# Patient Record
Sex: MALE | Race: BLACK OR AFRICAN AMERICAN | NOT HISPANIC OR LATINO | Employment: FULL TIME | URBAN - METROPOLITAN AREA
[De-identification: names, ages, dates, MRNs, and addresses within clinical notes are randomized per-mention and may not be internally consistent; named-entity substitution may affect disease eponyms.]

---

## 2018-05-07 ENCOUNTER — HISTORICAL (OUTPATIENT)
Dept: ENDOSCOPY | Facility: HOSPITAL | Age: 43
End: 2018-05-07

## 2018-06-07 ENCOUNTER — HISTORICAL (OUTPATIENT)
Dept: ADMINISTRATIVE | Facility: HOSPITAL | Age: 43
End: 2018-06-07

## 2018-06-07 LAB
ALBUMIN SERPL-MCNC: 3.4 GM/DL (ref 3.4–5)
ALBUMIN/GLOB SERPL: 1 RATIO (ref 1–2)
ALP SERPL-CCNC: 67 UNIT/L (ref 45–117)
ALT SERPL-CCNC: 17 UNIT/L (ref 12–78)
APTT PPP: 30.5 SECOND(S) (ref 23.3–37)
AST SERPL-CCNC: 17 UNIT/L (ref 15–37)
BILIRUB SERPL-MCNC: 0.5 MG/DL (ref 0.2–1)
BILIRUBIN DIRECT+TOT PNL SERPL-MCNC: 0.2 MG/DL
BILIRUBIN DIRECT+TOT PNL SERPL-MCNC: 0.3 MG/DL
BUN SERPL-MCNC: 8 MG/DL (ref 7–18)
CALCIUM SERPL-MCNC: 8.5 MG/DL (ref 8.5–10.1)
CHLORIDE SERPL-SCNC: 106 MMOL/L (ref 98–107)
CO2 SERPL-SCNC: 31 MMOL/L (ref 21–32)
CREAT SERPL-MCNC: 1.3 MG/DL (ref 0.6–1.3)
ERYTHROCYTE [DISTWIDTH] IN BLOOD BY AUTOMATED COUNT: 13.2 % (ref 11.5–14.5)
GLOBULIN SER-MCNC: 2.9 GM/ML (ref 2.3–3.5)
GLUCOSE SERPL-MCNC: 68 MG/DL (ref 74–106)
HCT VFR BLD AUTO: 50.9 % (ref 40–51)
HGB BLD-MCNC: 17.7 GM/DL (ref 13.5–17.5)
INR PPP: 1.07 (ref 0.9–1.2)
MCH RBC QN AUTO: 33.7 PG (ref 26–34)
MCHC RBC AUTO-ENTMCNC: 34.8 GM/DL (ref 31–37)
MCV RBC AUTO: 96.8 FL (ref 80–100)
PLATELET # BLD AUTO: 215 X10(3)/MCL (ref 130–400)
PMV BLD AUTO: 9.6 FL (ref 7.4–10.4)
POTASSIUM SERPL-SCNC: 4.7 MMOL/L (ref 3.5–5.1)
PROT SERPL-MCNC: 6.3 GM/DL (ref 6.4–8.2)
PROTHROMBIN TIME: 13.2 SECOND(S) (ref 11.9–14.4)
RBC # BLD AUTO: 5.26 X10(6)/MCL (ref 4.5–5.9)
SODIUM SERPL-SCNC: 143 MMOL/L (ref 136–145)
WBC # SPEC AUTO: 6.2 X10(3)/MCL (ref 4.5–11)

## 2018-06-13 ENCOUNTER — HISTORICAL (OUTPATIENT)
Dept: SURGERY | Facility: HOSPITAL | Age: 43
End: 2018-06-13

## 2022-04-10 ENCOUNTER — HISTORICAL (OUTPATIENT)
Dept: ADMINISTRATIVE | Facility: HOSPITAL | Age: 47
End: 2022-04-10

## 2022-04-26 VITALS
SYSTOLIC BLOOD PRESSURE: 110 MMHG | BODY MASS INDEX: 29.26 KG/M2 | DIASTOLIC BLOOD PRESSURE: 74 MMHG | WEIGHT: 216.06 LBS | OXYGEN SATURATION: 100 % | HEIGHT: 72 IN

## 2022-04-29 NOTE — OP NOTE
DATE OF SURGERY:    06/13/2018    SURGEON:  Milo Quintero M.D.    attending physician:  Zafar Kirkpatrick III, MD    ATTENDING PHYSICIAN:  Zafar Kirkpatrick MD    RESIDENT SURGEONS:  MD Thomas Loja MD Jaden Kifer, MD    PREOPERATIVE DIAGNOSIS:  Bilateral inguinal hernia.    POSTOPERATIVE DIAGNOSIS:  Bilateral inguinal hernia.   PROCEDURE:  Bilateral inguinal hernia repair with mesh.   ANESTHESIA:  General endotracheal anesthesia.    COMPLICATIONS:  None.   FINDING:  A small direct left inguinal hernia which was repaired with mesh.  Right large indirect hernia with chronic scarring, which was highly ligated and repaired with mesh.    EBL:  20 mL.   INDICATIONS:  This is a 43-year-old male who recently underwent a [place] laparotomy for perforated ulcer in February and since then has been complaining of bulges in the bilateral groin area.  Upon presentation to our clinic, he was noted to have reducible inguinal hernias.  Repair risks and benefits were discussed with the patient and he agreed to move forward with the surgery.   OPERATION IN DETAIL:  The patient was taken to the operative suite and placed in supine position.  Anesthesia was induced.  The patient is intubated without any event.  We next turned our attention to the left groin.  An incision was made from the ASIS to the pubic tubercle.  Dissection was carried down all the way to the aponeurosis of the external oblique.  The aponeurosis was incised and the external inguinal ring was opened.  The spermatic cord was isolated from the base of the inguinal canal.  There was noted a large bulging hernia sac medial to the epigastric vessels.  This hernia sac was dissected free from the surrounding tissue.  The floor was partially repaired using figure-of-eight Prolene stitch.  We then proceeded to place a polypropylene mesh within the floor of the inguinal canal, this was cut to size.  We began by placing our initial stitch at the  pubic tubercle with overlap of the mesh using a Prolene suture.  We proceeded with multiple interrupted sutures along the shelving edge of the inguinal ligament to the mesh, and then along the conjoin tendon cephalad.  The internal ring was reapproximated with the mesh.  The external oblique aponeurosis was then closed with running Vicryl and the skin was closed with 4-0 PDS.     We then turned our attention to the right groin.  An incision was made in between the pubic tubercle and ASIS.  This incision was carried down to the external oblique aponeurosis.  The external oblique aponeurosis was incised and opened up toward the external ring.  The spermatic cord was then isolated from the base of the inguinal canal.  There was noted to be extensive fibrosis of the cremasteric muscles.  Once the cremasteric muscles were split, we were able to identify this large hernia sac which is lateral to the epigastric vessels.  This hernia sac was then  from the spermatic cord contents.  It was highly ligated after it was opened up and noted to be no abdominal contents within the sac.  The sac was highly ligated, it was reduced.  Polypropylene mesh was placed at the base of the inguinal canal and cut appropriately to size.  A Prolene stitch was placed at the pubic tubercle.  Interrupted Prolene suture was also placed along the inguinal ligament and then along the conjoin tendon to secure the mesh in place.  The internal ring was then reapproximated with the mesh, then the external oblique aponeurosis was then closed with Vicryl and then the skin was closed with 4-0 PDS.  This ended our procedure.  Patient was extubated and taken to the PACU without any event.        ______________________________  ROSANNA Loja/LIGIA  DD:  06/13/2018  Time:  08:56PM  DT:  06/13/2018  Time:  09:42PM  Job #:  [id number]

## 2022-04-29 NOTE — OP NOTE
DATE OF SURGERY:    05/07/2018    SURGEON:  Melody Govea MD    attending physician:  Dr. Melody Govea III, MD, MD    RESIDENT SURGEON:  Key Marquez MD.  PGY 2.    PREOPERATIVE DIAGNOSES:    1. History of perforated duodenal ulcer.  2. History of Helicobacter pylori.    POSTOPERATIVE DIAGNOSIS:  Healed duodenal ulcer.    PROCEDURE PERFORMED:  Esophagogastroduodenoscopy with biopsies.    ANESTHESIA:  Sedation with propofol.    INDICATION FOR PROCEDURE:  The patient is a 43-year-old male who is status post exploratory laparotomy and [place] patch repair of a perforated duodenal ulcer in February of 2018.  He was referred to GI clinic for EGD surveillance following his surgery.  The patient had a positive H pylori test at the time of his admission.  He completed his treatment.    PROCEDURE:  After appropriate informed consent had been obtained and all questions had been answered, the patient was brought to the procedure room.  He was connected to the appropriate monitoring devices.  Oxygen was administered to the patient continuously via nasal cannula.  A time-out was performed verifying the correct patient, procedure.  The patient was then placed in the left lateral decubitus position.  A bite block was placed.  IV sedation was administered by the Anesthesia team in divided dosages throughout the procedure.  Once the appropriate level of sedation was achieved, the procedure was begun.  A well lubricated endoscope was inserted into the mouth and advanced over the tongue.  It was advanced under direct visualization through the esophagus, stomach, and duodenum.  The patient was noted to have a pseudodiverticulum of the anterior duodenal bulb, which was likely the site of his ulcer repair.  No active inflammation or ulcer disease was noted.  The endoscope was then withdrawn through the pylorus into the stomach.  Biopsies of the stomach antrum were taken with cold biopsy forceps x2 to assess for H pylori  eradication.  The entire stomach was inspected.  No mucosal abnormalities were seen.  The scope was retroflexed.  No abnormalities of the cardia or fundus of the stomach were noted.  The scope was then withdrawn through the GE junction into the esophagus.  No abnormalities of the structure or mucosa of the esophagus were seen.  The scope was then fully withdrawn.  The procedure completed.  The patient tolerated the procedure well.  He was awakened from anesthesia and transferred to the recovery room in stable condition.    ESTIMATED BLOOD LOSS:  Minimal.    SPECIMEN:  Biopsies of stomach antrum x2.    COMPLICATIONS:  None.    FOLLOWUP:  The patient does not require a repeat EGD unless clinically indicated.        ______________________________  CAT CHAN MD    ______________________________  MD BRENNAN Del Toro/LIGIA  DD:  05/07/2018  Time:  04:28PM  DT:  05/07/2018  Time:  04:45PM  Job #:  [id number]

## 2022-05-02 NOTE — HISTORICAL [facility]ER
This is a historical note converted from [employer]. Formatting and pictures may have been removed.  Please reference [employer] for original formatting and attached multimedia. Preoperative Diagnosis  bilateral inguinal hernia  Postoperative Diagnosis  same, left groin with direct hernia, right groin indirect hernia  Operation  bilateral inguinal hernia repair with mesh  Surgeon(s)  Cirilo Quintero MD  Assistant  Thomas Mcdonald MD Kifer, Jaden, MD  Anesthesia  General Endotracheal  Estimated Blood Loss  20 cc  Specimen(s)  right groin hernia sac  Complications  none  Technique  see dictation   This certifies I was present during the key portion of this surgical procedure.

## 2022-05-02 NOTE — HISTORICAL [facility]
This is a historical note converted from [employer]. Formatting and pictures may have been removed.  Please reference [employer] for original formatting and attached multimedia. Chief Complaint  b/l inguinal hernias, R > L  History of Present Illness  43-year-old male?that recently?underwent an exploratory laparotomy for perforated ulcer in February that?presented to clinic?after undergoing EGD approximately two weeks prior. EGD showed gastritis without H pylori, and patient is not on a PPI. Denied abdominal pain, hematochezia, melena, BRBPR. He was?complaining of bilateral inguinal bulges. Both sides are reducible but the right side is significantly more painful in the left side is asymptomatic. He is tolerating regular diet with normal bowel movements. He is here today for?open?bilateral inguinal hernia repair.  Review of Systems  See HPI. ?Otherwise 12 point review of systems negative  Physical Exam  NAD, AAO  NCAT  Unlabored respirations  Regular rate  Abdomen soft,?nontender, nondistended.? Incision well healing.  Bilateral inguinal hernias, reducible, right greater than left.  Assessment/Plan  43-year-old male with bilateral inguinal hernias with right side greater than left.  ?  - proceed to the?OR for open bilateral inguinal hernia repair  - the risk, benefits, alternatives of the procedure were discussed with the patient in detail including the inherent risk of bleeding, infection, pain, scarring, risk of recurrence, and need for further surgery?and the patient demonstrates understanding and wishes to proceed with the procedure   Problem List/Past Medical History  Ongoing  Inguinal hernia  Obesity  Tobacco user  Historical  No qualifying data  Procedure/Surgical History  Biopsy Gastrointestinal (05/07/2018), Esophagogastroduodenoscopy (05/07/2018), Esophagogastroduodenoscopy, flexible, transoral; with biopsy, single or multiple (05/07/2018), Excision of Stomach, Pylorus, Via Natural or Artificial Opening  Endoscopic, Diagnostic (05/07/2018), Exploration Laparotomy (02/25/2018), Supplement Duodenum with Autologous Tissue Substitute, Open Approach (02/25/2018), Shoulder (1994).  Medications  Inpatient  No active inpatient medications  Home  HYDROCO/APAP TAB 7.5-325, 1 tab(s), Oral, QID,? ?Not Taking, Completed Rx  Nexium 20 mg oral delayed release capsule, 20 mg= 1 cap(s), Oral, Daily  Allergies  amoxicillin  Social History  Alcohol - Denies Alcohol Use, 01/20/2016  Never, 02/12/2017  Substance Abuse - Denies Substance Abuse, 01/20/2016  Current, Marijuana, 02/25/2018  Tobacco - Medium Risk, 01/20/2016  Current every day smoker, 01/06/2017  Family History  Family history is negative      Above reviewed and pt is prepared for gissell inguinal hernia repair.

## 2022-08-11 ENCOUNTER — HOSPITAL ENCOUNTER (EMERGENCY)
Facility: HOSPITAL | Age: 47
Discharge: HOME | End: 2022-08-11
Attending: INTERNAL MEDICINE
Payer: MEDICAID

## 2022-08-11 VITALS
HEIGHT: 72 IN | TEMPERATURE: 99 F | DIASTOLIC BLOOD PRESSURE: 80 MMHG | WEIGHT: 315 LBS | HEART RATE: 62 BPM | BODY MASS INDEX: 42.66 KG/M2 | OXYGEN SATURATION: 98 % | SYSTOLIC BLOOD PRESSURE: 127 MMHG | RESPIRATION RATE: 18 BRPM

## 2022-08-11 DIAGNOSIS — L03.90 CELLULITIS, UNSPECIFIED CELLULITIS SITE: ICD-10-CM

## 2022-08-11 DIAGNOSIS — B35.9 TINEA: Primary | ICD-10-CM

## 2022-08-11 PROCEDURE — 25000003 PHARM REV CODE 250: Performed by: PHYSICIAN ASSISTANT

## 2022-08-11 PROCEDURE — 99284 EMERGENCY DEPT VISIT MOD MDM: CPT

## 2022-08-11 RX ORDER — HYDROCODONE BITARTRATE AND ACETAMINOPHEN 5; 325 MG/1; MG/1
1 TABLET ORAL EVERY 4 HOURS PRN
Qty: 20 TABLET | Refills: 0 | Status: SHIPPED | OUTPATIENT
Start: 2022-08-11 | End: 2022-08-16

## 2022-08-11 RX ORDER — SULFAMETHOXAZOLE AND TRIMETHOPRIM 800; 160 MG/1; MG/1
1 TABLET ORAL 2 TIMES DAILY
Qty: 14 TABLET | Refills: 0 | Status: SHIPPED | OUTPATIENT
Start: 2022-08-11 | End: 2022-08-18

## 2022-08-11 RX ORDER — HYDROCODONE BITARTRATE AND ACETAMINOPHEN 10; 325 MG/1; MG/1
1 TABLET ORAL
Status: COMPLETED | OUTPATIENT
Start: 2022-08-11 | End: 2022-08-11

## 2022-08-11 RX ADMIN — HYDROCODONE BITARTRATE AND ACETAMINOPHEN 1 TABLET: 10; 325 TABLET ORAL at 08:08

## 2022-08-11 NOTE — Clinical Note
"Kaleb Araizarick" Nimisha was seen and treated in our emergency department on 8/11/2022.  He may return to work on 08/17/2022.       If you have any questions or concerns, please don't hesitate to call.      BIANKA Alba"

## 2022-08-12 NOTE — ED PROVIDER NOTES
Encounter Date: 8/11/2022       History     Chief Complaint   Patient presents with    Foot Swelling     47-year-old male presents to ED for evaluation bilateral foot pain and swelling.  States that he feels like his feet are infected.  Reports that he has a rash and itching to the bottom of his sole and between his toes.  Reports that he works long hours on feet often getting wet.  Denies any fever.  Denies any drainage.  Denies any history of diabetes.        Review of patient's allergies indicates:   Allergen Reactions    Amoxicillin Swelling     Past Medical History:   Diagnosis Date    Hypertension      Past Surgical History:   Procedure Laterality Date    STOMACH SURGERY      2018     History reviewed. No pertinent family history.  Social History     Tobacco Use    Smoking status: Current Every Day Smoker     Packs/day: 0.50     Types: Cigarettes    Smokeless tobacco: Never Used   Substance Use Topics    Alcohol use: Not Currently    Drug use: Never     Review of Systems   Constitutional: Negative for chills, fatigue and fever.   Respiratory: Negative for cough and shortness of breath.    Cardiovascular: Negative for chest pain and leg swelling.   Gastrointestinal: Negative for nausea and vomiting.   Genitourinary: Negative for dysuria.   Musculoskeletal: Negative for back pain.        Foot pain   Skin: Positive for rash.   Neurological: Negative for dizziness, weakness and headaches.   Hematological: Does not bruise/bleed easily.       Physical Exam     Initial Vitals [08/11/22 1925]   BP Pulse Resp Temp SpO2   127/80 62 18 98.6 °F (37 °C) 98 %      MAP       --         Physical Exam    Vitals reviewed.  Constitutional: He appears well-developed. He is cooperative.   HENT:   Head: Normocephalic and atraumatic.   Right Ear: External ear normal.   Left Ear: External ear normal.   Mouth/Throat: Oropharynx is clear and moist.   Eyes: Conjunctivae and EOM are normal. Pupils are equal, round, and reactive  to light.   Neck: Neck supple.   Normal range of motion.  Cardiovascular: Normal rate, regular rhythm and normal heart sounds.   Pulmonary/Chest: Breath sounds normal. No respiratory distress. He has no wheezes. He has no rhonchi. He has no rales.   Abdominal: Abdomen is soft. Bowel sounds are normal. There is no abdominal tenderness.   Musculoskeletal:      Cervical back: Normal range of motion and neck supple.      Comments: Bilateral foot swelling. Cracking noted to plantar surface and between toes.Nails hardened and white. Mild drainage with foul smelling odor noted     Neurological: He is alert and oriented to person, place, and time. He has normal strength. No cranial nerve deficit or sensory deficit. GCS score is 15. GCS eye subscore is 4. GCS verbal subscore is 5. GCS motor subscore is 6.   Skin: Skin is warm and dry.   Psychiatric: He has a normal mood and affect.         ED Course   Procedures  Labs Reviewed - No data to display       Imaging Results    None          Medications   HYDROcodone-acetaminophen  mg per tablet 1 tablet (has no administration in time range)     Medical Decision Making:   ED Management:  47-year-old male presents to ED for evaluation foot infection.  Patient with bilateral tinea pedis superimposed with a bacterial infection.  Will place on topical antifungal and give antibiotics.  Discussed need for follow-up with PCP/podiatry for re-evaluation and possible oral antifungals. Will give short course of pain medication.  Discussed treating shoes with Lysol. All questions answered. Patient verbalizes understanding and agrees with plan of care.                      Clinical Impression:   Final diagnoses:  [B35.9] Tinea (Primary)  [L03.90] Cellulitis, unspecified cellulitis site          ED Disposition Condition    Discharge Stable        ED Prescriptions     Medication Sig Dispense Start Date End Date Auth. Provider    HYDROcodone-acetaminophen (NORCO) 5-325 mg per tablet Take 1  tablet by mouth every 4 (four) hours as needed for Pain. 20 tablet 8/11/2022 8/16/2022 BIANKA Alba    ketoconazole 2 % Gel Apply 1 application topically 2 (two) times daily. 45 g 8/11/2022  BIANKA Alba    sulfamethoxazole-trimethoprim 800-160mg (BACTRIM DS) 800-160 mg Tab Take 1 tablet by mouth 2 (two) times daily. for 7 days 14 tablet 8/11/2022 8/18/2022 BIANKA Alba        Follow-up Information     Follow up With Specialties Details Why Contact Info    Carson Lozada MD Internal Medicine   [address]  [address]  [facility] [address]  [phone]      Almas Blancas Jr., DPM Podiatry   [address]  [address]  [facility] [address]  [phone]      Herbie Villasenor DPM Podiatry   [address]  [facility] [address]  [phone]             BIANKA Alba  08/11/22 2011

## 2022-08-12 NOTE — DISCHARGE INSTRUCTIONS
Keep feet clean and dry. Treat shoes with Lysol. Apply topical antifungal twice a day. Take full course of antibiotics. Follow up with PCP/Podiatry for further evaluation.

## 2023-01-18 ENCOUNTER — HOSPITAL ENCOUNTER (EMERGENCY)
Facility: HOSPITAL | Age: 48
Discharge: HOME | End: 2023-01-18
Attending: EMERGENCY MEDICINE
Payer: MEDICAID

## 2023-01-18 VITALS
HEART RATE: 84 BPM | DIASTOLIC BLOOD PRESSURE: 74 MMHG | SYSTOLIC BLOOD PRESSURE: 132 MMHG | HEIGHT: 72 IN | WEIGHT: 255 LBS | BODY MASS INDEX: 34.54 KG/M2 | RESPIRATION RATE: 20 BRPM | TEMPERATURE: 97 F | OXYGEN SATURATION: 98 %

## 2023-01-18 DIAGNOSIS — U07.1 COVID-19: Primary | ICD-10-CM

## 2023-01-18 LAB
FLUAV AG UPPER RESP QL IA.RAPID: NOT DETECTED
FLUBV AG UPPER RESP QL IA.RAPID: NOT DETECTED
RSV A 5' UTR RNA NPH QL NAA+PROBE: NOT DETECTED
SARS-COV-2 RNA RESP QL NAA+PROBE: DETECTED
STREP A PCR (OHS): NOT DETECTED

## 2023-01-18 PROCEDURE — 87651 STREP A DNA AMP PROBE: CPT | Performed by: EMERGENCY MEDICINE

## 2023-01-18 PROCEDURE — 99284 EMERGENCY DEPT VISIT MOD MDM: CPT

## 2023-01-18 PROCEDURE — 0241U COVID/RSV/FLU A&B PCR: CPT | Performed by: EMERGENCY MEDICINE

## 2023-01-18 RX ORDER — ONDANSETRON 4 MG/1
4 TABLET, ORALLY DISINTEGRATING ORAL EVERY 6 HOURS PRN
Qty: 30 TABLET | Refills: 0 | Status: SHIPPED | OUTPATIENT
Start: 2023-01-18 | End: 2023-01-28

## 2023-01-18 RX ORDER — BENZONATATE 100 MG/1
100 CAPSULE ORAL 3 TIMES DAILY PRN
Qty: 20 CAPSULE | Refills: 0 | Status: SHIPPED | OUTPATIENT
Start: 2023-01-18 | End: 2023-01-28

## 2023-01-18 RX ORDER — ALBUTEROL SULFATE 90 UG/1
1-2 AEROSOL, METERED RESPIRATORY (INHALATION) EVERY 6 HOURS PRN
Qty: 18 G | Refills: 0 | Status: SHIPPED | OUTPATIENT
Start: 2023-01-18

## 2023-01-18 NOTE — ED PROVIDER NOTES
Encounter Date: 1/18/2023       History     Chief Complaint   Patient presents with    COVID-19 Concerns     C/o cough and sniffles. Girlfriend recently tested positive for Covid.      The patient is a 48 y.o. male  who presents to the Emergency Department with a chief complaint of chills. Symptoms began yesterday and have been constant since onset. Associated symptoms include runny nose and body aches. His pain is currently rated as a 0/10 in severity. The patient denies chest pain or SOB. He reports taking nothing prior to arrival with no relief of symptoms. He states that his girlfriend recently tested positive for covid. No other reported symptoms at this time     The history is provided by the patient. No  was used.   Cough  This is a new problem. The current episode started yesterday. The problem occurs every few hours. The problem has been unchanged. The cough is Non-productive. There has been no fever. Associated symptoms include chills, sweats and rhinorrhea. Pertinent negatives include no chest pain, no weight loss, no ear congestion, no ear pain, no headaches, no sore throat, no myalgias, no shortness of breath, no wheezing and no eye redness. He has tried nothing for the symptoms. The treatment provided no relief. He is a smoker.   Review of patient's allergies indicates:   Allergen Reactions    Amoxicillin Swelling     Past Medical History:   Diagnosis Date    Hypertension      Past Surgical History:   Procedure Laterality Date    ABDOMINAL SURGERY      SHOULDER SURGERY Right     STOMACH SURGERY      2018     History reviewed. No pertinent family history.  Social History     Tobacco Use    Smoking status: Every Day     Packs/day: 0.50     Types: Cigarettes    Smokeless tobacco: Never   Substance Use Topics    Alcohol use: Not Currently    Drug use: Never     Review of Systems   Constitutional:  Positive for chills. Negative for fever and weight loss.   HENT:  Positive for rhinorrhea.  Negative for ear pain and sore throat.    Eyes:  Negative for redness.   Respiratory:  Positive for cough. Negative for shortness of breath and wheezing.    Cardiovascular:  Negative for chest pain.   Gastrointestinal:  Negative for abdominal pain, diarrhea, nausea and vomiting.   Genitourinary:  Negative for dysuria.   Musculoskeletal:  Positive for arthralgias. Negative for back pain and myalgias.   Skin:  Negative for rash.   Neurological:  Negative for weakness and headaches.   Hematological:  Does not bruise/bleed easily.   All other systems reviewed and are negative.    Physical Exam     Initial Vitals [01/18/23 1651]   BP Pulse Resp Temp SpO2   (!) 141/96 89 18 98.6 °F (37 °C) 99 %      MAP       --         Physical Exam    Nursing note and vitals reviewed.  Constitutional: He appears well-developed and well-nourished. He is Obese .   HENT:   Head: Normocephalic.   Right Ear: Hearing and tympanic membrane normal.   Left Ear: Hearing and tympanic membrane normal.   Nose: Rhinorrhea present.   Mouth/Throat: Uvula is midline, oropharynx is clear and moist and mucous membranes are normal.   Eyes: Conjunctivae are normal. Pupils are equal, round, and reactive to light.   Cardiovascular:  Regular rhythm, normal heart sounds and normal pulses.           Pulmonary/Chest: Effort normal and breath sounds normal.   Abdominal: Abdomen is soft. Bowel sounds are normal. There is no abdominal tenderness.     Lymphadenopathy:     He has no cervical adenopathy.   Neurological: He is alert. GCS eye subscore is 4. GCS verbal subscore is 5. GCS motor subscore is 6.   Skin: Skin is warm, dry and intact. Capillary refill takes less than 2 seconds.       ED Course   Procedures  Labs Reviewed   COVID/RSV/FLU A&B PCR - Abnormal; Notable for the following components:       Result Value    SARS-CoV-2 PCR Detected (*)     All other components within normal limits    Narrative:     The Xpert Xpress SARS-CoV-2/FLU/RSV plus is a rapid,  multiplexed real-time PCR test intended for the simultaneous qualitative detection and differentiation of SARS-CoV-2, Influenza A, Influenza B, and respiratory syncytial virus (RSV) viral RNA in either nasopharyngeal swab or nasal swab specimens.         STREP GROUP A BY PCR - Normal    Narrative:     The Xpert Xpress Strep A test is a rapid, qualitative in vitro diagnostic test for the detection of Streptococcus pyogenes (Group A ß-hemolytic Streptococcus, Strep A) in throat swab specimens from patients with signs and symptoms of pharyngitis.            Imaging Results    None          Medications - No data to display  Medical Decision Making:   Initial Assessment:   The patient is a 48 y.o. male  who presents to the Emergency Department with a chief complaint of chills. Symptoms began yesterday and have been constant since onset. Associated symptoms include runny nose and body aches. His pain is currently rated as a 0/10 in severity. The patient denies chest pain or SOB. He reports taking nothing prior to arrival with no relief of symptoms. He states that his girlfriend recently tested positive for covid. No other reported symptoms at this time     Awake and alert, NAD  Differential Diagnosis:   Viral syndrome, covid, flu, strep   Clinical Tests:   Lab Tests: Ordered and Reviewed  ED Management:  MDM  History obtained by patient.   Co-morbidities and/or factors adding to the complexity or risk for the patient?: none  Differential diagnoses: Viral syndrome, covid, flu, strep   Decision to obtain previous or outside records?: no  Chart Review (nursing home, outside records, CareEverywhere): no  Review of RX medications/new RX prescribed by me?: tessalon, albuterol  My EKG Interpretation: see above  Labs/imaging/other tests obtained/considered (risk/benefits of testing discussed): covid, flu, strep  Labs/tests intepretation: covid positive  My independent imaging interpretation: none  Treatment/interventions, IV  fluids, IV medications: RX for albuterol and tessalone  Complex management (IV controlled substances, went to OR, DNR, meds requiring monitoring, transfer, etc)?: none  Workup/treatment affected by social determinants of health?: none   Point of care US done/interpretation: none  Consults/radiologist/EMS/social work/family discussion/alternate history: none  Advanced care planning/end of life discussion: none  Shared decision making: shared decision making with patient.  ETOH/smoking/drug cessation discussion: Discussed. Patient not ready.  Dispo: discharge home. Given strict er return precaustions.                          Clinical Impression:   Final diagnoses:  [U07.1] COVID-19 (Primary)        ED Disposition Condition    Discharge Stable          ED Prescriptions       Medication Sig Dispense Start Date End Date Auth. Provider    benzonatate (TESSALON) 100 MG capsule Take 1 capsule (100 mg total) by mouth 3 (three) times daily as needed for Cough. 20 capsule 1/18/2023 1/28/2023 Albina Conrad NP    albuterol (PROVENTIL/VENTOLIN HFA) 90 mcg/actuation inhaler Inhale 1-2 puffs into the lungs every 6 (six) hours as needed for Wheezing. Rescue 18 g 1/18/2023 -- Albina Conrad NP          Follow-up Information       Follow up With Specialties Details Why Contact Info    Carson Lozada MD Internal Medicine Schedule an appointment as soon as possible for a visit  As needed, If symptoms worsen [address] [place] [address]  [address]  [facility] [address]  [phone]               Albina Conrad NP  01/18/23 7986

## 2024-01-02 ENCOUNTER — OFFICE VISIT (OUTPATIENT)
Dept: URGENT CARE | Facility: CLINIC | Age: 49
End: 2024-01-02
Payer: MEDICAID

## 2024-01-02 VITALS
RESPIRATION RATE: 18 BRPM | DIASTOLIC BLOOD PRESSURE: 85 MMHG | HEIGHT: 72 IN | TEMPERATURE: 99 F | OXYGEN SATURATION: 99 % | HEART RATE: 69 BPM | SYSTOLIC BLOOD PRESSURE: 132 MMHG | WEIGHT: 267 LBS | BODY MASS INDEX: 36.16 KG/M2

## 2024-01-02 DIAGNOSIS — J06.9 UPPER RESPIRATORY TRACT INFECTION, UNSPECIFIED TYPE: Primary | ICD-10-CM

## 2024-01-02 DIAGNOSIS — R05.9 COUGH, UNSPECIFIED TYPE: ICD-10-CM

## 2024-01-02 LAB
CTP QC/QA: YES
CTP QC/QA: YES
POC MOLECULAR INFLUENZA A AGN: NEGATIVE
POC MOLECULAR INFLUENZA B AGN: NEGATIVE
SARS-COV-2 RDRP RESP QL NAA+PROBE: NEGATIVE

## 2024-01-02 PROCEDURE — 87635 SARS-COV-2 COVID-19 AMP PRB: CPT | Mod: PBBFAC | Performed by: FAMILY MEDICINE

## 2024-01-02 PROCEDURE — 87502 INFLUENZA DNA AMP PROBE: CPT | Mod: PBBFAC | Performed by: FAMILY MEDICINE

## 2024-01-02 PROCEDURE — 99214 OFFICE O/P EST MOD 30 MIN: CPT | Mod: S$PBB,,, | Performed by: FAMILY MEDICINE

## 2024-01-02 PROCEDURE — 99213 OFFICE O/P EST LOW 20 MIN: CPT | Mod: PBBFAC | Performed by: FAMILY MEDICINE

## 2024-01-02 RX ORDER — PREDNISONE 20 MG/1
20 TABLET ORAL DAILY
Qty: 5 TABLET | Refills: 0 | Status: SHIPPED | OUTPATIENT
Start: 2024-01-02 | End: 2024-01-07

## 2024-01-02 NOTE — LETTER
January 2, 2024      [facility]  [address] [address]  Phone: [phone]       Patient: Kaleb Bansal   YOB: 1975  Date of Visit: 01/02/2024    To Whom It May Concern:    Nasima Bansal  was at [facility] on 01/02/2024. The patient may return to work/school on 1/3/2024 with no restrictions. If you have any questions or concerns, or if I can be of further assistance, please do not hesitate to contact me.    Sincerely,    ALMA Bernstein MD

## 2024-01-02 NOTE — PROGRESS NOTES
Subjective:       Patient ID: Kaleb Bansal Sr. is a 48 y.o. male.    Chief Complaint: URI (Cough, headache, for 2 days)      HPI  48-year-old male with cough and headache for 2 days.  No known sick contacts.  Over-the-counter medication has been any  Review of Systems   Respiratory:          As above   Neurological:         As above         Objective:       Vital Signs  Temp: 98.5 °F (36.9 °C)  Temp Source: Oral  Pulse: 69  Resp: 18  SpO2: 99 %  BP: 132/85  Height and Weight  Height: 6' (182.9 cm)  Weight: 121.1 kg (267 lb)  BSA (Calculated - sq m): 2.48 sq meters  BMI (Calculated): 36.2  Weight in (lb) to have BMI = 25: 183.9]  Physical Exam  Vitals reviewed.   Constitutional:       Appearance: Normal appearance.   HENT:      Head: Normocephalic and atraumatic.   Eyes:      Extraocular Movements: Extraocular movements intact.      Conjunctiva/sclera: Conjunctivae normal.   Cardiovascular:      Rate and Rhythm: Normal rate and regular rhythm.      Heart sounds: Normal heart sounds.   Pulmonary:      Breath sounds: Normal breath sounds.   Skin:     General: Skin is warm and dry.   Neurological:      General: No focal deficit present.      Mental Status: He is alert.   Psychiatric:         Mood and Affect: Mood normal.         Behavior: Behavior normal.         Assessment:       Problem List Items Addressed This Visit    None  Visit Diagnoses       Upper respiratory tract infection, unspecified type    -  Primary    Relevant Medications    predniSONE (DELTASONE) 20 MG tablet    Cough, unspecified type        Relevant Orders    POCT COVID-19 Rapid Screening (Completed)    POCT Influenza A/B Molecular (Completed)            Plan:   Encouraged antihistamines as needed  Encouraged ibuprofen/acetaminophen as needed  ER precautions  Follow-up with PCP

## 2024-01-22 ENCOUNTER — HOSPITAL ENCOUNTER (EMERGENCY)
Facility: HOSPITAL | Age: 49
Discharge: HOME | End: 2024-01-22
Attending: STUDENT IN AN ORGANIZED HEALTH CARE EDUCATION/TRAINING PROGRAM
Payer: MEDICAID

## 2024-01-22 VITALS
BODY MASS INDEX: 35.89 KG/M2 | HEART RATE: 64 BPM | HEIGHT: 72 IN | OXYGEN SATURATION: 96 % | WEIGHT: 265 LBS | DIASTOLIC BLOOD PRESSURE: 86 MMHG | RESPIRATION RATE: 18 BRPM | SYSTOLIC BLOOD PRESSURE: 136 MMHG | TEMPERATURE: 97 F

## 2024-01-22 DIAGNOSIS — K04.7 DENTAL INFECTION: Primary | ICD-10-CM

## 2024-01-22 DIAGNOSIS — K02.9 DENTAL DECAY: ICD-10-CM

## 2024-01-22 PROCEDURE — 99284 EMERGENCY DEPT VISIT MOD MDM: CPT

## 2024-01-22 RX ORDER — HYDROCODONE BITARTRATE AND ACETAMINOPHEN 5; 325 MG/1; MG/1
1 TABLET ORAL EVERY 6 HOURS PRN
Qty: 12 TABLET | Refills: 0 | Status: SHIPPED | OUTPATIENT
Start: 2024-01-22

## 2024-01-22 RX ORDER — CLINDAMYCIN HYDROCHLORIDE 150 MG/1
450 CAPSULE ORAL EVERY 8 HOURS
Qty: 63 CAPSULE | Refills: 0 | Status: SHIPPED | OUTPATIENT
Start: 2024-01-22 | End: 2024-01-29

## 2024-01-22 RX ORDER — CHLORHEXIDINE GLUCONATE ORAL RINSE 1.2 MG/ML
15 SOLUTION DENTAL 2 TIMES DAILY
Qty: 420 ML | Refills: 0 | Status: SHIPPED | OUTPATIENT
Start: 2024-01-22 | End: 2024-02-05

## 2024-01-23 NOTE — ED PROVIDER NOTES
Encounter Date: 1/22/2024       History     Chief Complaint   Patient presents with    Dental Pain     HPI..    49-year-old male with a past history of hypertension presents emergency department for tooth pain.  Patient states that he has a cracked tooth that is infected.  States the pain started 2 days ago.  Has not followed up with a dentist.  States he needs to have some teeth pulled.  No fevers.  No facial swelling    Review of patient's allergies indicates:   Allergen Reactions    Amoxicillin Swelling     Past Medical History:   Diagnosis Date    Hypertension      Past Surgical History:   Procedure Laterality Date    ABDOMINAL SURGERY      SHOULDER SURGERY Right     STOMACH SURGERY      2018     No family history on file.  Social History     Tobacco Use    Smoking status: Every Day     Current packs/day: 0.50     Types: Cigarettes    Smokeless tobacco: Never   Substance Use Topics    Alcohol use: Not Currently    Drug use: Never     Review of Systems   Constitutional:  Negative for fever.   HENT:  Positive for dental problem. Negative for sore throat.    Respiratory:  Negative for cough and shortness of breath.    Cardiovascular:  Negative for chest pain.   Gastrointestinal:  Negative for abdominal pain, constipation, diarrhea, nausea and vomiting.   Genitourinary:  Negative for dysuria.   Musculoskeletal:  Negative for back pain.   Skin:  Negative for rash.   Neurological:  Negative for weakness and headaches.   Hematological:  Does not bruise/bleed easily.   All other systems reviewed and are negative.      Physical Exam     Initial Vitals [01/22/24 2341]   BP Pulse Resp Temp SpO2   136/86 64 18 97.4 °F (36.3 °C) 96 %      MAP       --         Physical Exam    Nursing note and vitals reviewed.  Constitutional: He appears well-developed and well-nourished. No distress.   HENT:   Exquisitely poor dentition with multiple dental caries.  There is a left lower cracked molar with gingival erythema concerning for  early dental abscess.   Cardiovascular:  Normal rate and regular rhythm.           Pulmonary/Chest: Breath sounds normal. No respiratory distress.   Abdominal: Abdomen is soft. There is no abdominal tenderness.   Musculoskeletal:         General: No tenderness. Normal range of motion.     Neurological: He is alert and oriented to person, place, and time.   Skin: Skin is warm. Capillary refill takes less than 2 seconds.         ED Course   Procedures  Labs Reviewed - No data to display       Imaging Results    None          Medications - No data to display  Medical Decision Making  differential diagnosis  Dental infection, dental abscess, dental pain,  as well as multiple other possible etiologies      Problems Addressed:  Dental decay: chronic illness or injury  Dental infection: acute illness or injury    Risk  Prescription drug management.                                      Clinical Impression:  Final diagnoses:  [K04.7] Dental infection (Primary)  [K02.9] Dental decay          ED Disposition Condition    Discharge Stable          ED Prescriptions       Medication Sig Dispense Start Date End Date Auth. Provider    clindamycin (CLEOCIN) 150 MG capsule Take 3 capsules (450 mg total) by mouth every 8 (eight) hours. for 7 days 63 capsule 1/22/2024 1/29/2024 Freddy Escobedo MD    chlorhexidine (PERIDEX) 0.12 % solution Use as directed 15 mLs in the mouth or throat 2 (two) times daily. for 14 days 420 mL 1/22/2024 2/5/2024 Freddy Escobedo MD    HYDROcodone-acetaminophen (NORCO) 5-325 mg per tablet Take 1 tablet by mouth every 6 (six) hours as needed for Pain. 12 tablet 1/22/2024 -- Freddy Escobedo MD          Follow-up Information       Follow up With Specialties Details Why Contact Info    Carson Lozada MD Internal Medicine Schedule an appointment as soon as possible for a visit   [address] [place] [address]  [address] [place] [id number]  [phone]      [facility] - Emergency Dept  Emergency Medicine Go to  If symptoms worsen [address]  [facility] [id number] [phone]    Follow-up with dentist  Call                Freddy Escobedo MD  01/22/24 1122

## 2024-04-18 ENCOUNTER — HOSPITAL ENCOUNTER (EMERGENCY)
Facility: HOSPITAL | Age: 49
Discharge: HOME | End: 2024-04-18
Attending: EMERGENCY MEDICINE

## 2024-04-18 VITALS
RESPIRATION RATE: 16 BRPM | TEMPERATURE: 97 F | SYSTOLIC BLOOD PRESSURE: 136 MMHG | BODY MASS INDEX: 31.83 KG/M2 | OXYGEN SATURATION: 96 % | HEIGHT: 72 IN | HEART RATE: 69 BPM | DIASTOLIC BLOOD PRESSURE: 99 MMHG | WEIGHT: 235 LBS

## 2024-04-18 DIAGNOSIS — K08.89 PAIN, DENTAL: Primary | ICD-10-CM

## 2024-04-18 PROCEDURE — 99283 EMERGENCY DEPT VISIT LOW MDM: CPT

## 2024-04-18 RX ORDER — IBUPROFEN 800 MG/1
800 TABLET ORAL EVERY 6 HOURS PRN
Qty: 20 TABLET | Refills: 0 | Status: SHIPPED | OUTPATIENT
Start: 2024-04-18

## 2024-04-18 RX ORDER — IBUPROFEN 400 MG/1
800 TABLET ORAL
Status: DISCONTINUED | OUTPATIENT
Start: 2024-04-18 | End: 2024-04-18 | Stop reason: HOSPADM

## 2024-04-18 RX ORDER — CLINDAMYCIN HYDROCHLORIDE 150 MG/1
300 CAPSULE ORAL 4 TIMES DAILY
Qty: 56 CAPSULE | Refills: 0 | Status: SHIPPED | OUTPATIENT
Start: 2024-04-18 | End: 2024-04-25

## 2024-04-18 NOTE — ED PROVIDER NOTES
Encounter Date: 4/18/2024       History     Chief Complaint   Patient presents with    Dental Pain     The history is provided by the patient.   Dental Pain  The primary symptoms include mouth pain. Primary symptoms do not include headaches, fever, shortness of breath, sore throat or cough. The symptoms began several weeks ago. The symptoms are worsening. The symptoms occur constantly.   Affected locations include: teeth. At its highest the mouth pain was at 10/10.   Additional symptoms include: dental sensitivity to temperature, gum swelling, gum tenderness, jaw pain, facial swelling and trouble swallowing. Additional symptoms do not include: pain with swallowing, drooling, ear pain and fatigue.     Review of patient's allergies indicates:   Allergen Reactions    Amoxicillin Swelling     Past Medical History:   Diagnosis Date    Hypertension      Past Surgical History:   Procedure Laterality Date    ABDOMINAL SURGERY      SHOULDER SURGERY Right     STOMACH SURGERY      2018     No family history on file.  Social History     Tobacco Use    Smoking status: Every Day     Current packs/day: 0.50     Types: Cigarettes    Smokeless tobacco: Never   Substance Use Topics    Alcohol use: Not Currently    Drug use: Never     Review of Systems   Constitutional:  Negative for chills, diaphoresis, fatigue and fever.   HENT:  Positive for facial swelling and trouble swallowing. Negative for congestion, drooling, ear discharge, ear pain, postnasal drip, rhinorrhea, sinus pressure, sinus pain, sore throat and tinnitus.    Eyes:  Negative for discharge.   Respiratory:  Negative for cough, chest tightness, shortness of breath and wheezing.    Cardiovascular:  Negative for chest pain and palpitations.   Gastrointestinal:  Negative for abdominal pain, diarrhea, nausea and vomiting.   Genitourinary:  Negative for frequency, hematuria and urgency.   Musculoskeletal:  Negative for back pain, neck pain and neck stiffness.   Skin:   Negative for rash.   Neurological:  Negative for syncope, weakness, light-headedness, numbness and headaches.   Psychiatric/Behavioral:  Negative for suicidal ideas.        Physical Exam     Initial Vitals [04/18/24 0143]   BP Pulse Resp Temp SpO2   (!) 136/99 69 16 97.3 °F (36.3 °C) 96 %      MAP       --         Physical Exam    Nursing note and vitals reviewed.  HENT:   Head:       Mouth/Throat: Uvula is midline and oropharynx is clear and moist.       Neck: Phonation normal.           ED Course   Procedures  Labs Reviewed - No data to display       Imaging Results    None          Medications   ibuprofen tablet 800 mg (has no administration in time range)     Medical Decision Making  Medical Decision Making  Problem list/ differential diagnosis including but not limited to:  Abscess, malingering, dental pain, Milton's      Patient's chronic illnesses impacting care:  none      Diagnostic test considered but not ordered:      My interpretations:    Radiology reports      Discussion of case with external qualified healthcare professionals:  Not applicable      Review of external notes( inpt, ems, NH, clinic):      Decision rules/scores:    Medications reviewed:  Medications ordered in the ER:  Ibuprofen  Discharge prescriptions:  Clindamycin and ibuprofen    Social variables possible impacting patient's healthcare:    Code status/discussion    Shared decision making:    Consideration for admission versus discharge: stable for discharge                                       Clinical Impression:  Final diagnoses:  [K08.89] Pain, dental (Primary)          ED Disposition Condition    Discharge Good          ED Prescriptions       Medication Sig Dispense Start Date End Date Auth. Provider    ibuprofen (ADVIL,MOTRIN) 800 MG tablet Take 1 tablet (800 mg total) by mouth every 6 (six) hours as needed for Pain. 20 tablet 4/18/2024 -- Jhonny Munguia MD    clindamycin (CLEOCIN) 150 MG capsule Take 2 capsules (300 mg  total) by mouth 4 (four) times daily. for 7 days 56 capsule 4/18/2024 4/25/2024 Jhonny Munguia MD          Follow-up Information    None          Jhonny Munguia MD  04/18/24 0154

## 2024-07-27 ENCOUNTER — HOSPITAL ENCOUNTER (EMERGENCY)
Facility: HOSPITAL | Age: 49
Discharge: HOME | End: 2024-07-27
Attending: EMERGENCY MEDICINE
Payer: COMMERCIAL

## 2024-07-27 VITALS
DIASTOLIC BLOOD PRESSURE: 83 MMHG | SYSTOLIC BLOOD PRESSURE: 125 MMHG | TEMPERATURE: 97 F | RESPIRATION RATE: 18 BRPM | HEART RATE: 92 BPM | OXYGEN SATURATION: 96 % | HEIGHT: 73 IN | WEIGHT: 250 LBS | BODY MASS INDEX: 33.13 KG/M2

## 2024-07-27 DIAGNOSIS — K08.89 PAIN, DENTAL: Primary | ICD-10-CM

## 2024-07-27 DIAGNOSIS — K02.9 DENTAL CARIES: ICD-10-CM

## 2024-07-27 DIAGNOSIS — K04.7 DENTAL ABSCESS: ICD-10-CM

## 2024-07-27 PROCEDURE — 96372 THER/PROPH/DIAG INJ SC/IM: CPT | Performed by: PHYSICIAN ASSISTANT

## 2024-07-27 PROCEDURE — 99284 EMERGENCY DEPT VISIT MOD MDM: CPT | Mod: 25

## 2024-07-27 PROCEDURE — 63600175 PHARM REV CODE 636 W HCPCS: Performed by: PHYSICIAN ASSISTANT

## 2024-07-27 PROCEDURE — 25000003 PHARM REV CODE 250: Performed by: PHYSICIAN ASSISTANT

## 2024-07-27 RX ORDER — KETOROLAC TROMETHAMINE 30 MG/ML
30 INJECTION, SOLUTION INTRAMUSCULAR; INTRAVENOUS
Status: COMPLETED | OUTPATIENT
Start: 2024-07-27 | End: 2024-07-27

## 2024-07-27 RX ORDER — HYDROCODONE BITARTRATE AND ACETAMINOPHEN 7.5; 325 MG/1; MG/1
1 TABLET ORAL EVERY 6 HOURS PRN
Qty: 12 TABLET | Refills: 0 | Status: SHIPPED | OUTPATIENT
Start: 2024-07-27

## 2024-07-27 RX ORDER — IBUPROFEN 800 MG/1
800 TABLET ORAL EVERY 6 HOURS PRN
Qty: 20 TABLET | Refills: 0 | Status: SHIPPED | OUTPATIENT
Start: 2024-07-27

## 2024-07-27 RX ORDER — CLINDAMYCIN HYDROCHLORIDE 300 MG/1
300 CAPSULE ORAL
Qty: 14 CAPSULE | Refills: 0 | Status: SHIPPED | OUTPATIENT
Start: 2024-07-27 | End: 2024-08-03

## 2024-07-27 RX ORDER — HYDROCODONE BITARTRATE AND ACETAMINOPHEN 10; 325 MG/1; MG/1
1 TABLET ORAL
Status: COMPLETED | OUTPATIENT
Start: 2024-07-27 | End: 2024-07-27

## 2024-07-27 RX ADMIN — HYDROCODONE BITARTRATE AND ACETAMINOPHEN 1 TABLET: 10; 325 TABLET ORAL at 02:07

## 2024-07-27 RX ADMIN — KETOROLAC TROMETHAMINE 30 MG: 30 INJECTION, SOLUTION INTRAMUSCULAR at 02:07

## 2024-10-12 ENCOUNTER — OFFICE VISIT (OUTPATIENT)
Dept: URGENT CARE | Facility: CLINIC | Age: 49
End: 2024-10-12
Payer: COMMERCIAL

## 2024-10-12 VITALS
DIASTOLIC BLOOD PRESSURE: 90 MMHG | SYSTOLIC BLOOD PRESSURE: 137 MMHG | HEIGHT: 73 IN | OXYGEN SATURATION: 100 % | WEIGHT: 264.88 LBS | HEART RATE: 60 BPM | BODY MASS INDEX: 35.11 KG/M2 | TEMPERATURE: 98 F | RESPIRATION RATE: 18 BRPM

## 2024-10-12 DIAGNOSIS — R19.7 DIARRHEA, UNSPECIFIED TYPE: Primary | ICD-10-CM

## 2024-10-12 DIAGNOSIS — R10.9 ABDOMINAL CRAMPING: ICD-10-CM

## 2024-10-12 PROCEDURE — 99214 OFFICE O/P EST MOD 30 MIN: CPT | Mod: PBBFAC

## 2024-10-12 PROCEDURE — 99213 OFFICE O/P EST LOW 20 MIN: CPT | Mod: S$PBB,,,

## 2024-10-12 RX ORDER — PANTOPRAZOLE SODIUM 40 MG/1
40 TABLET, DELAYED RELEASE ORAL DAILY
Qty: 30 TABLET | Refills: 0 | Status: SHIPPED | OUTPATIENT
Start: 2024-10-12

## 2024-10-12 RX ORDER — DICYCLOMINE HYDROCHLORIDE 20 MG/1
20 TABLET ORAL 4 TIMES DAILY PRN
Qty: 20 TABLET | Refills: 0 | Status: SHIPPED | OUTPATIENT
Start: 2024-10-12

## 2024-10-12 RX ORDER — LOPERAMIDE HYDROCHLORIDE 2 MG/1
2 CAPSULE ORAL 4 TIMES DAILY PRN
Qty: 20 CAPSULE | Refills: 0 | Status: SHIPPED | OUTPATIENT
Start: 2024-10-12

## 2024-10-12 NOTE — LETTER
October 12, 2024      [facility]  [address] [address]  Phone: [phone]       Patient: Kaleb Bansal   YOB: 1975  Date of Visit: 10/12/2024    To Whom It May Concern:    Nasima Bansal  was at [facility] on 10/12/2024. The patient may return to work/school on 10/13/2024 with no restrictions. If you have any questions or concerns, or if I can be of further assistance, please do not hesitate to contact me.    Sincerely,    PENNY Torres

## 2024-10-12 NOTE — PROGRESS NOTES
"Subjective:       Patient ID: Kaleb Bansal Sr. is a 49 y.o. male.    Vitals:  height is 6' 1" (1.854 m) and weight is 120.2 kg (264 lb 14.4 oz). His oral temperature is 98.1 °F (36.7 °C). His blood pressure is 137/90 (abnormal) and his pulse is 60. His respiration is 18 and oxygen saturation is 100%.     Chief Complaint: Abdominal Pain (Patient states abd pain, loose bowel movements x 1 month. States started vomiting x today and has not used OTC meds for symptoms. Denies fever.)    48 y/o AAM with hx of HTN presents to  alone, he reports GI symptoms x 1 month after taking clindamycin.  Has not tried any over-the-counter treatment.  Patient reports was told he has a history of increased stomach acid.    Abdominal Pain  Associated symptoms include diarrhea and vomiting. Pertinent negatives include no dysuria, fever, headaches or hematochezia.       Constitution: Positive for appetite change. Negative for fever and unexpected weight change.   Gastrointestinal:  Positive for abdominal pain, vomiting and diarrhea. Negative for bright red blood in stool, dark colored stools, rectal pain and heartburn.   Genitourinary:  Negative for dysuria and urine decreased.   Neurological:  Negative for headaches.       Objective:      Physical Exam   Constitutional: He is oriented to person, place, and time. He is cooperative. He is easily aroused.  Non-toxic appearance. He does not appear ill. obesityawake  HENT:   Head: Normocephalic and atraumatic.   Mouth/Throat: Oropharynx is clear and moist.   Cardiovascular: Normal rate.   Pulmonary/Chest: Effort normal and breath sounds normal.   Abdominal: Soft. Bowel sounds are increased. flat abdomen There is abdominal tenderness in the right lower quadrant. There is no left CVA tenderness and no right CVA tenderness.   Musculoskeletal:      Right lower leg: No edema.      Left lower leg: No edema.   Neurological: He is alert, oriented to person, place, and time and easily aroused. " Gait normal. GCS eye subscore is 4. GCS verbal subscore is 5. GCS motor subscore is 6.   Skin: Skin is warm, dry, intact and not diaphoretic. Capillary refill takes less than 2 seconds.   Psychiatric: His speech is normal. Mood and affect normal.   Nursing note and vitals reviewed.        Assessment:       1. Diarrhea, unspecified type    2. Abdominal cramping          Plan:     Unable to collect stool sample at this time, differentials discussed with patient.  Encouraged to adhere to good dieting habits, ensure he remains hydrated, may take Imodium PRN for excessive diarrhea.  Encouraged patient to establish a PCP in his insurance network.  Strict ER precautions discussed, patient verbalizes understanding.    Diarrhea, unspecified type  -     loperamide (IMODIUM) 2 mg capsule; Take 1 capsule (2 mg total) by mouth 4 (four) times daily as needed for Diarrhea.  Dispense: 20 capsule; Refill: 0    Abdominal cramping  -     pantoprazole (PROTONIX) 40 MG tablet; Take 1 tablet (40 mg total) by mouth once daily.  Dispense: 30 tablet; Refill: 0  -     dicyclomine (BENTYL) 20 mg tablet; Take 1 tablet (20 mg total) by mouth 4 (four) times daily as needed (abdominal spasms).  Dispense: 20 tablet; Refill: 0                Medical Decision Making:   Differential Diagnosis:   IBS, C.Diff, Crohn's , Colitis, Among Others

## 2025-01-18 ENCOUNTER — HOSPITAL ENCOUNTER (EMERGENCY)
Facility: HOSPITAL | Age: 50
Discharge: HOME | End: 2025-01-18
Attending: EMERGENCY MEDICINE
Payer: COMMERCIAL

## 2025-01-18 VITALS
WEIGHT: 256 LBS | HEIGHT: 72 IN | BODY MASS INDEX: 34.67 KG/M2 | OXYGEN SATURATION: 98 % | DIASTOLIC BLOOD PRESSURE: 87 MMHG | SYSTOLIC BLOOD PRESSURE: 137 MMHG | RESPIRATION RATE: 18 BRPM | HEART RATE: 88 BPM | TEMPERATURE: 99 F

## 2025-01-18 DIAGNOSIS — K02.9 PAIN DUE TO DENTAL CARIES: Primary | ICD-10-CM

## 2025-01-18 DIAGNOSIS — K05.30 PERIODONTITIS: ICD-10-CM

## 2025-01-18 PROCEDURE — 99284 EMERGENCY DEPT VISIT MOD MDM: CPT

## 2025-01-18 PROCEDURE — 25000003 PHARM REV CODE 250: Performed by: EMERGENCY MEDICINE

## 2025-01-18 RX ORDER — IBUPROFEN 800 MG/1
800 TABLET ORAL 3 TIMES DAILY
Qty: 30 TABLET | Refills: 0 | Status: SHIPPED | OUTPATIENT
Start: 2025-01-18 | End: 2025-01-28

## 2025-01-18 RX ORDER — CHLORHEXIDINE GLUCONATE ORAL RINSE 1.2 MG/ML
15 SOLUTION DENTAL 2 TIMES DAILY
Qty: 420 ML | Refills: 0 | Status: SHIPPED | OUTPATIENT
Start: 2025-01-18 | End: 2025-02-01

## 2025-01-18 RX ORDER — OXYCODONE AND ACETAMINOPHEN 5; 325 MG/1; MG/1
2 TABLET ORAL
Status: COMPLETED | OUTPATIENT
Start: 2025-01-18 | End: 2025-01-18

## 2025-01-18 RX ORDER — CLINDAMYCIN HYDROCHLORIDE 150 MG/1
300 CAPSULE ORAL 4 TIMES DAILY
Qty: 56 CAPSULE | Refills: 0 | Status: SHIPPED | OUTPATIENT
Start: 2025-01-18 | End: 2025-01-25

## 2025-01-18 RX ORDER — HYDROCODONE BITARTRATE AND ACETAMINOPHEN 10; 325 MG/1; MG/1
1 TABLET ORAL EVERY 6 HOURS PRN
Qty: 20 TABLET | Refills: 0 | Status: SHIPPED | OUTPATIENT
Start: 2025-01-18 | End: 2025-01-23

## 2025-01-18 RX ADMIN — OXYCODONE HYDROCHLORIDE AND ACETAMINOPHEN 2 TABLET: 5; 325 TABLET ORAL at 08:01

## 2025-01-19 NOTE — ED PROVIDER NOTES
Encounter Date: 1/18/2025       History     Chief Complaint   Patient presents with    Dental Pain     Pt complaint of pain and swelling to left lower jaw area with dental problem that worsening over the past 2 days     The history is provided by the patient.   Dental Pain  The primary symptoms include mouth pain. Primary symptoms do not include fever, shortness of breath or sore throat. The symptoms began several days ago. The symptoms are worsening. The symptoms are new. The symptoms occur constantly.   Mouth pain began 3 - 5 days ago. Mouth pain occurs constantly. Mouth pain is worsening. Affected locations include: gum(s) and teeth.   Additional symptoms include: gum tenderness, jaw pain and facial swelling. Medical issues include: smoking.     Review of patient's allergies indicates:   Allergen Reactions    Amoxicillin Swelling     Past Medical History:   Diagnosis Date    Hypertension      Past Surgical History:   Procedure Laterality Date    ABDOMINAL SURGERY      SHOULDER SURGERY Right     STOMACH SURGERY      2018     No family history on file.  Social History     Tobacco Use    Smoking status: Every Day     Current packs/day: 0.50     Types: Cigarettes    Smokeless tobacco: Never   Substance Use Topics    Alcohol use: Not Currently    Drug use: Never     Review of Systems   Constitutional:  Negative for fever.   HENT:  Positive for facial swelling. Negative for sore throat.    Respiratory:  Negative for shortness of breath.    Cardiovascular:  Negative for chest pain.   Gastrointestinal:  Negative for nausea.   Genitourinary:  Negative for dysuria.   Musculoskeletal:  Negative for back pain.   Skin:  Negative for rash.   Neurological:  Negative for weakness.   Hematological:  Does not bruise/bleed easily.       Physical Exam     Initial Vitals [01/18/25 1858]   BP Pulse Resp Temp SpO2   137/87 88 18 98.9 °F (37.2 °C) 98 %      MAP       --         Physical Exam    Nursing note and vitals  reviewed.  Constitutional: He appears well-developed and well-nourished.   HENT:   Head: Normocephalic and atraumatic.       Right Ear: External ear normal.   Left Ear: External ear normal.   Nose: Nose normal. Mouth/Throat: Dental caries present.       Eyes: Conjunctivae and EOM are normal. Pupils are equal, round, and reactive to light.   Neck: Neck supple.   Normal range of motion.  Cardiovascular:  Normal rate, regular rhythm, normal heart sounds and intact distal pulses.           Pulmonary/Chest: Breath sounds normal.   Abdominal: Abdomen is soft. Bowel sounds are normal.   Musculoskeletal:         General: Normal range of motion.      Cervical back: Normal range of motion and neck supple.     Neurological: He is alert and oriented to person, place, and time. He has normal strength. GCS score is 15. GCS eye subscore is 4. GCS verbal subscore is 5. GCS motor subscore is 6.   Skin: Skin is warm and dry. Capillary refill takes less than 2 seconds.   Psychiatric: He has a normal mood and affect. His behavior is normal. Judgment and thought content normal.         ED Course   Procedures  Labs Reviewed - No data to display       Imaging Results    None          Medications   oxyCODONE-acetaminophen 5-325 mg per tablet 2 tablet (has no administration in time range)     Medical Decision Making  Risk  Prescription drug management.    Differential includes:  abscess, dental caries, periodontitis.  Ward lD/C with antibiotics and analgesics and provide list of dental resources.                                  Clinical Impression:  Final diagnoses:  [K02.9] Pain due to dental caries (Primary)  [K05.30] Periodontitis          ED Disposition Condition    Discharge Stable          ED Prescriptions       Medication Sig Dispense Start Date End Date Auth. Provider    clindamycin (CLEOCIN) 150 MG capsule Take 2 capsules (300 mg total) by mouth 4 (four) times daily. for 7 days 56 capsule 1/18/2025 1/25/2025 César Boggs  MD [place]    chlorhexidine (PERIDEX) 0.12 % solution Use as directed 15 mLs in the mouth or throat 2 (two) times daily. for 14 days 420 mL 1/18/2025 2/1/2025 César Boggs MD    ibuprofen (ADVIL,MOTRIN) 800 MG tablet Take 1 tablet (800 mg total) by mouth 3 (three) times daily. for 10 days 30 tablet 1/18/2025 1/28/2025 César Boggs MD    HYDROcodone-acetaminophen (NORCO)  mg per tablet Take 1 tablet by mouth every 6 (six) hours as needed for Pain. Final diagnoses: [K02.9] Pain due to dental caries (Primary) [K05.30] Periodontitis 20 tablet 1/18/2025 1/23/2025 César Boggs MD          Follow-up Information       Follow up With Specialties Details Why Contact Info    Follow up with a dentist as soon as possible                 César Boggs MD  01/18/25 2007

## 2025-01-19 NOTE — ED TRIAGE NOTES
Pt complaint of pain and swelling to left lower jaw area with dental problem that worsening over the past 2 days

## 2025-06-24 ENCOUNTER — HOSPITAL ENCOUNTER (EMERGENCY)
Facility: HOSPITAL | Age: 50
Discharge: HOME | End: 2025-06-24
Attending: INTERNAL MEDICINE
Payer: COMMERCIAL

## 2025-06-24 VITALS
DIASTOLIC BLOOD PRESSURE: 77 MMHG | WEIGHT: 254 LBS | SYSTOLIC BLOOD PRESSURE: 117 MMHG | OXYGEN SATURATION: 99 % | BODY MASS INDEX: 34.45 KG/M2 | TEMPERATURE: 98 F | HEART RATE: 70 BPM | RESPIRATION RATE: 20 BRPM

## 2025-06-24 DIAGNOSIS — B35.3 TINEA PEDIS OF RIGHT FOOT: Primary | ICD-10-CM

## 2025-06-24 PROCEDURE — 99282 EMERGENCY DEPT VISIT SF MDM: CPT

## 2025-06-24 RX ORDER — CLOTRIMAZOLE 1 %
CREAM (GRAM) TOPICAL
Qty: 45 G | Refills: 0 | Status: SHIPPED | OUTPATIENT
Start: 2025-06-24 | End: 2026-07-08

## 2025-06-24 NOTE — Clinical Note
"Kaleb Araizarick" Nimisha was seen and treated in our emergency department on 6/24/2025.  He may return to work on 06/25/2025.       If you have any questions or concerns, please don't hesitate to call.      VENITA Johnson RN    "

## 2025-06-24 NOTE — ED PROVIDER NOTES
"Encounter Date: 6/24/2025       History     Chief Complaint   Patient presents with    Foot Problem     He thinks he has an infected crack in the skin under his right small toe     A 50 y.o. male patient with a history of HTN presents to the ED with right foot pain. The onset is about two days ago. He reports pain especially between his fourth and fifth toe. He has noticed cracked skin and is concerned because he has previously had similar symptoms that resulted in severe infection. 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 Reports pain with movement of fourth and fifth toes. Denies purulent drainage, fever, chills, nausea, vomiting, dizziness. States that it "hurts too bad to walk" and is in a hospital issued wheelchair at time of examination.       The history is provided by the patient.     Review of patient's allergies indicates:   Allergen Reactions    Amoxicillin Swelling     Past Medical History:   Diagnosis Date    Hypertension      Past Surgical History:   Procedure Laterality Date    ABDOMINAL SURGERY      SHOULDER SURGERY Right     STOMACH SURGERY      2018     No family history on file.  Social History[1]  Review of Systems   Constitutional:  Negative for chills and fever.   Eyes:  Negative for visual disturbance.   Respiratory:  Negative for shortness of breath.    Cardiovascular:  Negative for chest pain.   Gastrointestinal:  Negative for abdominal pain, nausea and vomiting.   Genitourinary:  Negative for difficulty urinating and dysuria.   Musculoskeletal:  Negative for arthralgias.   Skin:  Positive for color change and rash.   Neurological:  Negative for weakness and headaches.   Hematological:  Does not bruise/bleed easily.   Psychiatric/Behavioral:  Negative for confusion.    All other systems reviewed and are negative.      Physical Exam     Initial Vitals [06/24/25 1624]   BP Pulse Resp Temp SpO2   117/77 70 20 98.3 °F (36.8 °C) 99 %      MAP       --     " "    Physical Exam    Nursing note and vitals reviewed.  Constitutional: He appears well-developed and well-nourished.   HENT:   Head: Normocephalic and atraumatic.   Right Ear: External ear normal.   Left Ear: External ear normal.   Nose: Nose normal.   Eyes: Conjunctivae and EOM are normal.   Neck: Neck supple.   Cardiovascular:  Normal rate, regular rhythm and normal heart sounds.     Exam reveals no gallop and no friction rub.       No murmur heard.  Pulmonary/Chest: Breath sounds normal. No respiratory distress. He has no wheezes. He has no rhonchi. He has no rales.   Abdominal: He exhibits no distension.   Musculoskeletal:         General: No tenderness or edema. Normal range of motion.      Cervical back: Neck supple.     Neurological: He is alert and oriented to person, place, and time. GCS eye subscore is 4. GCS verbal subscore is 5. GCS motor subscore is 6.   Skin: Skin is warm and dry. Capillary refill takes less than 2 seconds.   Cracking noted to plantar surface of right foot with maceration between 4th and 5th toes. No drainage or discharge. Toenails dark and thick b/l feet.         ED Course   Procedures  Labs Reviewed - No data to display       Imaging Results    None          Medications - No data to display  Medical Decision Making  A 50 y.o. male patient with a history of HTN presents to the ED with right foot pain. The onset is about two days ago. He reports pain especially between his fourth and fifth toe. He has noticed cracked skin and is concerned because he has previously had similar symptoms that resulted in severe infection. 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 Reports pain with movement of fourth and fifth toes. Denies purulent drainage, fever, chills, nausea, vomiting, dizziness. States that it "hurts too bad to walk" and is in a hospital issued wheelchair at time of examination.     Presentation consistent with tinea pedis. No erythema, warmth, " or tenderness to other areas of the foot. Strict return precautions given and recommendations to keep feet dry and change shoes and socks often.    Clinical impression:  Tinea pedis of right foot (Primary)    Patient is non-toxic appearing and tolerating nutritional intake. Patient's vital signs and clinical condition are stable for DC with ED Prescriptions     Medication Sig Dispense Start Date End Date Auth. Provider    clotrimazole (LOTRIMIN) 1 % cream Apply to affected area 2 times daily   for two weeks 45 g 6/24/2025 7/8/2026 Callie John PA         Follow-up: PCP or Internal medicine clinic within 3 days    Strict follow-up precautions given. Patient verbalizes understanding of treatment plan and ED return precautions.       Risk  Risk Details: Given strict ED return precautions. I have spoken with the patient and/or caregivers. I have explained the patient's condition, diagnoses and treatment plan based on the information available to me at this time. I have answered the patient's and/or caregiver's questions and addressed any concerns. The patient and/or caregivers have as good an understanding of the patient's diagnosis, condition and treatment plan as can be expected at this point. The patient's condition is stable and appropriate for discharge from the emergency department.      The patient will pursue further outpatient evaluation with the primary care physician or other designated or consulting physician as outlined in the discharge instructions. The patient and/or caregivers are agreeable to this plan of care and follow-up instructions have been explained in detail. The patient and/or caregivers have received these instructions in written format and have expressed an understanding of the discharge instructions. The patient and/or caregivers are aware that any significant change in condition or worsening of symptoms should prompt an immediate return to this or the closest emergency department or a  call to 911.               Additional MDM:   Differential Diagnosis:   Other: The following diagnoses were also considered and will be evaluated: dermatitis, abscess and cellulitis.                                   Clinical Impression:  Final diagnoses:  [B35.3] Tinea pedis of right foot (Primary)          ED Disposition Condition    Discharge Stable          ED Prescriptions       Medication Sig Dispense Start Date End Date Auth. Provider    clotrimazole (LOTRIMIN) 1 % cream Apply to affected area 2 times daily for two weeks 45 g 6/24/2025 7/8/2026 Callie John PA          Follow-up Information       Follow up With Specialties Details Why Contact Info    Carson Lozada MD Internal Medicine In 3 days  [address] [place] [address]  [address]  [facility] [address]  [phone]      [facility] Emergency Medicine Go to  If symptoms worsen, As needed [address] [facility] [address] [phone]                   [1]   Social History  Tobacco Use    Smoking status: Every Day     Current packs/day: 0.50     Types: Cigarettes    Smokeless tobacco: Never   Substance Use Topics    Alcohol use: Not Currently    Drug use: Never        Callie John PA  06/24/25 2575

## 2025-06-24 NOTE — DISCHARGE INSTRUCTIONS
Given strict ED return precautions. I have spoken with the patient and/or caregivers. I have explained the patient's condition, diagnoses and treatment plan based on the information available to me at this time. I have answered the patient's and/or caregiver's questions and addressed any concerns. The patient and/or caregivers have as good an understanding of the patient's diagnosis, condition and treatment plan as can be expected at this point. The patient's condition is stable and appropriate for discharge from the emergency department.      The patient will pursue further outpatient evaluation with the primary care physician or other designated or consulting physician as outlined in the discharge instructions. The patient and/or caregivers are agreeable to this plan of care and follow-up instructions have been explained in detail. The patient and/or caregivers have received these instructions in written format and have expressed an understanding of the discharge instructions. The patient and/or caregivers are aware that any significant change in condition or worsening of symptoms should prompt an immediate return to this or the closest emergency department or a call to 911.